# Patient Record
Sex: MALE | Race: WHITE | Employment: UNEMPLOYED
[De-identification: names, ages, dates, MRNs, and addresses within clinical notes are randomized per-mention and may not be internally consistent; named-entity substitution may affect disease eponyms.]

---

## 2024-07-16 ENCOUNTER — HOSPITAL ENCOUNTER (OUTPATIENT)
Facility: HOSPITAL | Age: 19
Discharge: HOME | End: 2024-07-19
Attending: SPECIALIST
Payer: MEDICAID

## 2024-07-16 DIAGNOSIS — D33.3 BENIGN NEOPLASM OF CRANIAL NERVES (HCC): ICD-10-CM

## 2024-07-16 DIAGNOSIS — H90.71 MIXED CONDUCTIVE AND SENSORINEURAL HEARING LOSS OF RIGHT EAR WITH UNRESTRICTED HEARING OF LEFT EAR: ICD-10-CM

## 2024-07-16 DIAGNOSIS — H72.01 CENTRAL PERFORATION OF TYMPANIC MEMBRANE OF RIGHT EAR: ICD-10-CM

## 2024-07-16 PROCEDURE — A9579 GAD-BASE MR CONTRAST NOS,1ML: HCPCS | Performed by: SPECIALIST

## 2024-07-16 PROCEDURE — 70553 MRI BRAIN STEM W/O & W/DYE: CPT

## 2024-07-16 PROCEDURE — 6360000004 HC RX CONTRAST MEDICATION: Performed by: SPECIALIST

## 2024-07-16 RX ADMIN — GADOTERIDOL 20 ML: 279.3 INJECTION, SOLUTION INTRAVENOUS at 12:02

## 2024-07-17 NOTE — PERIOP NOTE
PAT INTERVIEW DONE WITH PATIENT. HE WAS GIVEN INSTRUCTIONS ON MEDS, NPO PAST MIDNIGHT, AND USE OF CHG SOAP. HE WAS GIVEN OPPORTUNITY FOR QUESTIONS. HE VOICES UNDERSTANDING.

## 2024-07-23 ENCOUNTER — ANESTHESIA EVENT (OUTPATIENT)
Facility: HOSPITAL | Age: 19
End: 2024-07-23
Payer: MEDICAID

## 2024-07-23 ENCOUNTER — ANESTHESIA (OUTPATIENT)
Facility: HOSPITAL | Age: 19
End: 2024-07-23
Payer: MEDICAID

## 2024-07-23 ENCOUNTER — HOSPITAL ENCOUNTER (OUTPATIENT)
Facility: HOSPITAL | Age: 19
Setting detail: OUTPATIENT SURGERY
Discharge: HOME | End: 2024-07-23
Attending: SPECIALIST | Admitting: SPECIALIST
Payer: MEDICAID

## 2024-07-23 VITALS
SYSTOLIC BLOOD PRESSURE: 134 MMHG | HEIGHT: 68 IN | RESPIRATION RATE: 23 BRPM | OXYGEN SATURATION: 95 % | DIASTOLIC BLOOD PRESSURE: 71 MMHG | HEART RATE: 94 BPM | WEIGHT: 308 LBS | TEMPERATURE: 98.6 F | BODY MASS INDEX: 46.68 KG/M2

## 2024-07-23 DIAGNOSIS — G89.18 POST-OP PAIN: Primary | ICD-10-CM

## 2024-07-23 PROCEDURE — 3700000001 HC ADD 15 MINUTES (ANESTHESIA): Performed by: SPECIALIST

## 2024-07-23 PROCEDURE — 6370000000 HC RX 637 (ALT 250 FOR IP): Performed by: SPECIALIST

## 2024-07-23 PROCEDURE — 2500000003 HC RX 250 WO HCPCS: Performed by: NURSE ANESTHETIST, CERTIFIED REGISTERED

## 2024-07-23 PROCEDURE — 2580000003 HC RX 258: Performed by: NURSE ANESTHETIST, CERTIFIED REGISTERED

## 2024-07-23 PROCEDURE — 2709999900 HC NON-CHARGEABLE SUPPLY: Performed by: SPECIALIST

## 2024-07-23 PROCEDURE — 3600000004 HC SURGERY LEVEL 4 BASE: Performed by: SPECIALIST

## 2024-07-23 PROCEDURE — 3600000014 HC SURGERY LEVEL 4 ADDTL 15MIN: Performed by: SPECIALIST

## 2024-07-23 PROCEDURE — 6360000002 HC RX W HCPCS: Performed by: ANESTHESIOLOGY

## 2024-07-23 PROCEDURE — 3700000000 HC ANESTHESIA ATTENDED CARE: Performed by: SPECIALIST

## 2024-07-23 PROCEDURE — 6360000002 HC RX W HCPCS: Performed by: NURSE ANESTHETIST, CERTIFIED REGISTERED

## 2024-07-23 PROCEDURE — 7100000011 HC PHASE II RECOVERY - ADDTL 15 MIN: Performed by: SPECIALIST

## 2024-07-23 PROCEDURE — 7100000001 HC PACU RECOVERY - ADDTL 15 MIN: Performed by: SPECIALIST

## 2024-07-23 PROCEDURE — 6360000002 HC RX W HCPCS: Performed by: SPECIALIST

## 2024-07-23 PROCEDURE — 2580000003 HC RX 258: Performed by: ANESTHESIOLOGY

## 2024-07-23 PROCEDURE — 7100000000 HC PACU RECOVERY - FIRST 15 MIN: Performed by: SPECIALIST

## 2024-07-23 PROCEDURE — 7100000010 HC PHASE II RECOVERY - FIRST 15 MIN: Performed by: SPECIALIST

## 2024-07-23 PROCEDURE — 2500000003 HC RX 250 WO HCPCS: Performed by: SPECIALIST

## 2024-07-23 RX ORDER — CEFAZOLIN SODIUM 1 G/3ML
INJECTION, POWDER, FOR SOLUTION INTRAMUSCULAR; INTRAVENOUS PRN
Status: DISCONTINUED | OUTPATIENT
Start: 2024-07-23 | End: 2024-07-23 | Stop reason: SDUPTHER

## 2024-07-23 RX ORDER — MEPERIDINE HYDROCHLORIDE 25 MG/ML
12.5 INJECTION INTRAMUSCULAR; INTRAVENOUS; SUBCUTANEOUS EVERY 5 MIN PRN
Status: DISCONTINUED | OUTPATIENT
Start: 2024-07-23 | End: 2024-07-23 | Stop reason: HOSPADM

## 2024-07-23 RX ORDER — LIDOCAINE HYDROCHLORIDE AND EPINEPHRINE 10; 10 MG/ML; UG/ML
20 INJECTION, SOLUTION INFILTRATION; PERINEURAL ONCE
Status: CANCELLED | OUTPATIENT
Start: 2024-07-23 | End: 2024-07-23

## 2024-07-23 RX ORDER — HYDROCODONE BITARTRATE AND ACETAMINOPHEN 5; 325 MG/1; MG/1
1 TABLET ORAL EVERY 6 HOURS PRN
Qty: 20 TABLET | Refills: 0 | Status: SHIPPED | OUTPATIENT
Start: 2024-07-23 | End: 2024-07-28

## 2024-07-23 RX ORDER — DIPHENHYDRAMINE HYDROCHLORIDE 50 MG/ML
12.5 INJECTION INTRAMUSCULAR; INTRAVENOUS
Status: DISCONTINUED | OUTPATIENT
Start: 2024-07-23 | End: 2024-07-23 | Stop reason: HOSPADM

## 2024-07-23 RX ORDER — SODIUM CHLORIDE 0.9 % (FLUSH) 0.9 %
5-40 SYRINGE (ML) INJECTION EVERY 12 HOURS SCHEDULED
Status: DISCONTINUED | OUTPATIENT
Start: 2024-07-23 | End: 2024-07-23 | Stop reason: HOSPADM

## 2024-07-23 RX ORDER — SODIUM CHLORIDE 9 MG/ML
INJECTION, SOLUTION INTRAVENOUS CONTINUOUS
Status: DISCONTINUED | OUTPATIENT
Start: 2024-07-23 | End: 2024-07-23 | Stop reason: HOSPADM

## 2024-07-23 RX ORDER — CIPROFLOXACIN HYDROCHLORIDE 3.5 MG/ML
5 SOLUTION/ DROPS TOPICAL 2 TIMES DAILY
Qty: 10 ML | Refills: 5 | Status: SHIPPED | OUTPATIENT
Start: 2024-07-23 | End: 2024-08-22

## 2024-07-23 RX ORDER — SUCCINYLCHOLINE/SOD CL,ISO/PF 200MG/10ML
SYRINGE (ML) INTRAVENOUS PRN
Status: DISCONTINUED | OUTPATIENT
Start: 2024-07-23 | End: 2024-07-23 | Stop reason: SDUPTHER

## 2024-07-23 RX ORDER — SODIUM CHLORIDE 9 MG/ML
INJECTION, SOLUTION INTRAVENOUS PRN
Status: DISCONTINUED | OUTPATIENT
Start: 2024-07-23 | End: 2024-07-23 | Stop reason: HOSPADM

## 2024-07-23 RX ORDER — SODIUM CHLORIDE, SODIUM LACTATE, POTASSIUM CHLORIDE, CALCIUM CHLORIDE 600; 310; 30; 20 MG/100ML; MG/100ML; MG/100ML; MG/100ML
INJECTION, SOLUTION INTRAVENOUS CONTINUOUS
Status: DISCONTINUED | OUTPATIENT
Start: 2024-07-23 | End: 2024-07-23 | Stop reason: HOSPADM

## 2024-07-23 RX ORDER — HYDROMORPHONE HYDROCHLORIDE 1 MG/ML
0.5 INJECTION, SOLUTION INTRAMUSCULAR; INTRAVENOUS; SUBCUTANEOUS EVERY 5 MIN PRN
Status: DISCONTINUED | OUTPATIENT
Start: 2024-07-23 | End: 2024-07-23 | Stop reason: HOSPADM

## 2024-07-23 RX ORDER — GINSENG 100 MG
CAPSULE ORAL PRN
Status: DISCONTINUED | OUTPATIENT
Start: 2024-07-23 | End: 2024-07-23 | Stop reason: HOSPADM

## 2024-07-23 RX ORDER — FENTANYL CITRATE 50 UG/ML
INJECTION, SOLUTION INTRAMUSCULAR; INTRAVENOUS PRN
Status: DISCONTINUED | OUTPATIENT
Start: 2024-07-23 | End: 2024-07-23 | Stop reason: SDUPTHER

## 2024-07-23 RX ORDER — CIPROFLOXACIN HYDROCHLORIDE 3.5 MG/ML
5 SOLUTION/ DROPS TOPICAL 2 TIMES DAILY
Status: CANCELLED | OUTPATIENT
Start: 2024-07-23

## 2024-07-23 RX ORDER — ONDANSETRON 2 MG/ML
4 INJECTION INTRAMUSCULAR; INTRAVENOUS ONCE
Status: DISCONTINUED | OUTPATIENT
Start: 2024-07-23 | End: 2024-07-23 | Stop reason: HOSPADM

## 2024-07-23 RX ORDER — ROCURONIUM BROMIDE 10 MG/ML
INJECTION, SOLUTION INTRAVENOUS PRN
Status: DISCONTINUED | OUTPATIENT
Start: 2024-07-23 | End: 2024-07-23 | Stop reason: SDUPTHER

## 2024-07-23 RX ORDER — ONDANSETRON 2 MG/ML
4 INJECTION INTRAMUSCULAR; INTRAVENOUS
Status: DISCONTINUED | OUTPATIENT
Start: 2024-07-23 | End: 2024-07-23 | Stop reason: HOSPADM

## 2024-07-23 RX ORDER — MIDAZOLAM HYDROCHLORIDE 2 MG/2ML
2 INJECTION, SOLUTION INTRAMUSCULAR; INTRAVENOUS
Status: COMPLETED | OUTPATIENT
Start: 2024-07-23 | End: 2024-07-23

## 2024-07-23 RX ORDER — OFLOXACIN 3 MG/ML
SOLUTION AURICULAR (OTIC) PRN
Status: DISCONTINUED | OUTPATIENT
Start: 2024-07-23 | End: 2024-07-23 | Stop reason: HOSPADM

## 2024-07-23 RX ORDER — LIDOCAINE HYDROCHLORIDE 20 MG/ML
INJECTION, SOLUTION EPIDURAL; INFILTRATION; INTRACAUDAL; PERINEURAL PRN
Status: DISCONTINUED | OUTPATIENT
Start: 2024-07-23 | End: 2024-07-23 | Stop reason: SDUPTHER

## 2024-07-23 RX ORDER — GINSENG 100 MG
CAPSULE ORAL PRN
Status: CANCELLED | OUTPATIENT
Start: 2024-07-23

## 2024-07-23 RX ORDER — DEXAMETHASONE SODIUM PHOSPHATE 4 MG/ML
INJECTION, SOLUTION INTRA-ARTICULAR; INTRALESIONAL; INTRAMUSCULAR; INTRAVENOUS; SOFT TISSUE PRN
Status: DISCONTINUED | OUTPATIENT
Start: 2024-07-23 | End: 2024-07-23 | Stop reason: SDUPTHER

## 2024-07-23 RX ORDER — ONDANSETRON 2 MG/ML
INJECTION INTRAMUSCULAR; INTRAVENOUS PRN
Status: DISCONTINUED | OUTPATIENT
Start: 2024-07-23 | End: 2024-07-23 | Stop reason: SDUPTHER

## 2024-07-23 RX ORDER — LIDOCAINE HYDROCHLORIDE AND EPINEPHRINE 10; 10 MG/ML; UG/ML
INJECTION, SOLUTION INFILTRATION; PERINEURAL PRN
Status: DISCONTINUED | OUTPATIENT
Start: 2024-07-23 | End: 2024-07-23 | Stop reason: HOSPADM

## 2024-07-23 RX ORDER — EPINEPHRINE 1 MG/ML(1)
AMPUL (ML) INJECTION PRN
Status: DISCONTINUED | OUTPATIENT
Start: 2024-07-23 | End: 2024-07-23 | Stop reason: HOSPADM

## 2024-07-23 RX ORDER — FENTANYL CITRATE 50 UG/ML
50 INJECTION, SOLUTION INTRAMUSCULAR; INTRAVENOUS EVERY 5 MIN PRN
Status: DISCONTINUED | OUTPATIENT
Start: 2024-07-23 | End: 2024-07-23 | Stop reason: HOSPADM

## 2024-07-23 RX ORDER — SODIUM CHLORIDE 0.9 % (FLUSH) 0.9 %
5-40 SYRINGE (ML) INJECTION PRN
Status: DISCONTINUED | OUTPATIENT
Start: 2024-07-23 | End: 2024-07-23 | Stop reason: HOSPADM

## 2024-07-23 RX ORDER — PROPOFOL 10 MG/ML
INJECTION, EMULSION INTRAVENOUS PRN
Status: DISCONTINUED | OUTPATIENT
Start: 2024-07-23 | End: 2024-07-23 | Stop reason: SDUPTHER

## 2024-07-23 RX ORDER — HYDRALAZINE HYDROCHLORIDE 20 MG/ML
10 INJECTION INTRAMUSCULAR; INTRAVENOUS
Status: DISCONTINUED | OUTPATIENT
Start: 2024-07-23 | End: 2024-07-23 | Stop reason: HOSPADM

## 2024-07-23 RX ORDER — MIDAZOLAM HYDROCHLORIDE 2 MG/2ML
2 INJECTION, SOLUTION INTRAMUSCULAR; INTRAVENOUS
Status: DISCONTINUED | OUTPATIENT
Start: 2024-07-23 | End: 2024-07-23 | Stop reason: HOSPADM

## 2024-07-23 RX ORDER — DEXAMETHASONE SODIUM PHOSPHATE 10 MG/ML
10 INJECTION, SOLUTION INTRAMUSCULAR; INTRAVENOUS ONCE
Status: CANCELLED | OUTPATIENT
Start: 2024-07-23 | End: 2024-07-23

## 2024-07-23 RX ORDER — NALOXONE HYDROCHLORIDE 0.4 MG/ML
INJECTION, SOLUTION INTRAMUSCULAR; INTRAVENOUS; SUBCUTANEOUS PRN
Status: DISCONTINUED | OUTPATIENT
Start: 2024-07-23 | End: 2024-07-23 | Stop reason: HOSPADM

## 2024-07-23 RX ORDER — FENTANYL CITRATE 50 UG/ML
100 INJECTION, SOLUTION INTRAMUSCULAR; INTRAVENOUS
Status: DISCONTINUED | OUTPATIENT
Start: 2024-07-23 | End: 2024-07-23 | Stop reason: HOSPADM

## 2024-07-23 RX ORDER — AMOXICILLIN AND CLAVULANATE POTASSIUM 875; 125 MG/1; MG/1
1 TABLET, FILM COATED ORAL 2 TIMES DAILY
Qty: 14 TABLET | Refills: 0 | Status: SHIPPED | OUTPATIENT
Start: 2024-07-23 | End: 2024-07-30

## 2024-07-23 RX ORDER — PROCHLORPERAZINE EDISYLATE 5 MG/ML
5 INJECTION INTRAMUSCULAR; INTRAVENOUS
Status: DISCONTINUED | OUTPATIENT
Start: 2024-07-23 | End: 2024-07-23 | Stop reason: HOSPADM

## 2024-07-23 RX ADMIN — SODIUM CHLORIDE, POTASSIUM CHLORIDE, SODIUM LACTATE AND CALCIUM CHLORIDE: 600; 310; 30; 20 INJECTION, SOLUTION INTRAVENOUS at 11:33

## 2024-07-23 RX ADMIN — PROPOFOL 250 MG: 10 INJECTION, EMULSION INTRAVENOUS at 12:42

## 2024-07-23 RX ADMIN — LIDOCAINE HYDROCHLORIDE 50 MG: 20 INJECTION, SOLUTION EPIDURAL; INFILTRATION; INTRACAUDAL; PERINEURAL at 12:42

## 2024-07-23 RX ADMIN — Medication 200 MG: at 12:43

## 2024-07-23 RX ADMIN — FENTANYL CITRATE 100 MCG: 0.05 INJECTION, SOLUTION INTRAMUSCULAR; INTRAVENOUS at 12:42

## 2024-07-23 RX ADMIN — ROCURONIUM BROMIDE 10 MG: 10 INJECTION, SOLUTION INTRAVENOUS at 12:42

## 2024-07-23 RX ADMIN — MIDAZOLAM HYDROCHLORIDE 2 MG: 1 INJECTION, SOLUTION INTRAMUSCULAR; INTRAVENOUS at 12:27

## 2024-07-23 RX ADMIN — PHENYLEPHRINE HYDROCHLORIDE 40 MCG/MIN: 10 INJECTION INTRAVENOUS at 13:12

## 2024-07-23 RX ADMIN — FENTANYL CITRATE 50 MCG: 0.05 INJECTION, SOLUTION INTRAMUSCULAR; INTRAVENOUS at 12:57

## 2024-07-23 RX ADMIN — HYDROMORPHONE HYDROCHLORIDE 0.5 MG: 1 INJECTION, SOLUTION INTRAMUSCULAR; INTRAVENOUS; SUBCUTANEOUS at 14:53

## 2024-07-23 RX ADMIN — PROPOFOL 100 MG: 10 INJECTION, EMULSION INTRAVENOUS at 12:55

## 2024-07-23 RX ADMIN — DEXAMETHASONE SODIUM PHOSPHATE 4 MG: 4 INJECTION INTRA-ARTICULAR; INTRALESIONAL; INTRAMUSCULAR; INTRAVENOUS; SOFT TISSUE at 12:57

## 2024-07-23 RX ADMIN — CEFAZOLIN 3 G: 1 INJECTION, POWDER, FOR SOLUTION INTRAMUSCULAR; INTRAVENOUS at 12:52

## 2024-07-23 RX ADMIN — ONDANSETRON 4 MG: 2 INJECTION INTRAMUSCULAR; INTRAVENOUS at 13:52

## 2024-07-23 RX ADMIN — PROPOFOL 100 MG: 10 INJECTION, EMULSION INTRAVENOUS at 13:03

## 2024-07-23 RX ADMIN — SODIUM CHLORIDE, POTASSIUM CHLORIDE, SODIUM LACTATE AND CALCIUM CHLORIDE: 600; 310; 30; 20 INJECTION, SOLUTION INTRAVENOUS at 14:41

## 2024-07-23 RX ADMIN — FENTANYL CITRATE 50 MCG: 50 INJECTION INTRAMUSCULAR; INTRAVENOUS at 14:35

## 2024-07-23 RX ADMIN — PROPOFOL 50 MG: 10 INJECTION, EMULSION INTRAVENOUS at 12:51

## 2024-07-23 NOTE — DISCHARGE INSTRUCTIONS
THE [facility]  POSTOPERATIVE INSTRUCTIONS  FOR PATIENTS UNDERGOING  SURGERY OF THE EAR      Do not blow your nose for three weeks following surgery.  If you sneeze or cough do so with your mouth open.      DO NOT USE CPAP or BIPAP  x 4 weeks following the operation. DO NOT  USE Q-tips® or stick anything in the operated ear.      Avoid any heavy lifting (over 10 lbs.), straining or bending for three weeks following surgery.    Keep your head elevated as much as possible x 48 hours .  Sleep and rest on two to three pillows if possible.    Do not get water in your ear.  If showering or washing your hair place a piece of cotton coated in Vaseline in the ear canal to seal it.  If there is a separate incision keep this dry x 48 hours.    If you wear glasses either remove the arm on the operated side or make certain that it does not rest on the incision behind your ear for one week.    Beginning one day after surgery try to leave the cotton out of our ear as much as possible unless there is significant drainage.    Some drainage from your ear canal may occur after surgery.  If there is a separate incision some drainage may occur from this area also.  If the drainage is profuse or develops a foul odor please call.    Popping sounds, a plugged sensation, ringing or fluctuating hearing may be noticed in the ear during the healing.    Avoid travel by air for 3 weeks following surgery.    If you should notice any swelling, redness or excessive pain please call.    Some dizziness may occur after surgery.  If it becomes severe or is associated with nausea or vomiting please call.    Please call [facility] to make an appointment to be seen 7-10 days after the time of your surgery unless stated otherwise by your physician.      I understand and acknowledge receipt of the instructions indicated above.

## 2024-07-23 NOTE — BRIEF OP NOTE
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 [H72.01]     * Conductive hearing loss of right ear, unspecified hearing status on contralateral side [H90.11]    Post-Op Diagnosis: Same       Procedure(s):  RIGHT TRANSCANAL TYMPANOPLASTY/CARTILAGE GRAFT    Surgeon(s):  Calvin Gamez MD    Assistant:  Resident: Romie Leahy MD    Anesthesia: General    Estimated Blood Loss (mL): Minimal    Complications: None    Specimens:   * No specimens in log *    Implants:  * No implants in log *      Drains: * No LDAs found *    Findings:  Infection Present At Time Of Surgery (PATOS) (choose all levels that have infection present):  No infection present  Other Findings:     Electronically signed by Calvin Gamez MD on 7/23/2024 at 2:11 PM

## 2024-07-23 NOTE — H&P
[facility]   Calvin Gamez MD  [phone] E.A.R.S ([id number])  History and Physical    Subjective:      Satish Alcaraz is a 18 y.o. male who presents for correction  of right eardrum perforation .  The risks of the operation were reviewed in the office as well as in the preoperative area  in an effort to clarify the risks and complications of the operation and clarify any misconceptions.     History reviewed. No pertinent past medical history.  Past Surgical History:   Procedure Laterality Date    CHOLECYSTECTOMY  2024    LAP    TYMPANOSTOMY TUBE PLACEMENT Bilateral       Family History   Problem Relation Age of Onset    No Known Problems Mother     No Known Problems Father     No Known Problems Brother     Anesth Problems Neg Hx      Social History     Tobacco Use    Smoking status: Never    Smokeless tobacco: Never   Substance Use Topics    Alcohol use: Not Currently      Prior to Admission medications    Medication Sig Start Date End Date Taking? Authorizing Provider   OMEGA-3 FATTY ACIDS PO Take by mouth daily   Yes Provider, Historical, MD      No Known Allergies        Objective:      No data found.    No data recorded.      Physical Exam:  GENERAL: alert, cooperative, no distress, appears stated age,   LUNG: clear to auscultation bilaterally,    HEART: regular rate and rhythm,     AS: tympanic membrane normal no infection  AD: 25-50%  tympanic membrane perforation     Nose clear anteriorly  OC clear normal   Neck no masses      Assessment:     Right eardrum perforation  Right conductive hearing loss     Plan:     Discussed the risk of surgery including   Total hearing loss 1 %    Ear or skin scarring 100%,   Tinnitus formation 25%,   Change in taste 15%  Facial paralysis 1%,   Numbness of the ear 100%,    If a graft is placed failure can be as high as 25% depending your health and habits,   If cholesteatoma is found recurrence rates are 25% and the need for further operations discussed ,  Pt was asked to

## 2024-07-23 NOTE — ANESTHESIA POSTPROCEDURE EVALUATION
Department of Anesthesiology  Postprocedure Note    Patient: Satish Alcaraz  MRN: [id number]  YOB: 2005  Date of evaluation: 7/23/2024    Procedure Summary       Date: 07/23/24 Room / Location: [facility] MAIN OR [address] [place] [address] MAIN OR    Anesthesia Start: 1227 Anesthesia Stop: 1421    Procedure: RIGHT TRANSCANAL TYMPANOPLASTY/CARTILAGE GRAFT (Right) Diagnosis:       Central perforation of tympanic membrane of right ear      Conductive hearing loss of right ear, unspecified hearing status on contralateral side      (Central perforation of tympanic membrane of right ear [H72.01])      (Conductive hearing loss of right ear, unspecified hearing status on contralateral side [H90.11])    Providers: Calvin Gamez MD Responsible Provider: Rafy Godwin DO    Anesthesia Type: General ASA Status: 2            Anesthesia Type: General    Lizett Phase I:      Lizett Phase II:      Anesthesia Post Evaluation    Patient location during evaluation: PACU  Patient participation: complete - patient participated  Level of consciousness: awake  Pain score: 0  Airway patency: patent  Nausea & Vomiting: no nausea  Cardiovascular status: hemodynamically stable  Respiratory status: acceptable  Hydration status: euvolemic  Comments: Seen, no complaints   Pain management: adequate    No notable events documented.

## 2024-07-23 NOTE — ANESTHESIA PRE PROCEDURE
ROS  Exercise tolerance: good (>4 METS)          Rhythm: regular  Rate: normal                    Neuro/Psych:                ROS comment: Auditory dysfunction   GI/Hepatic/Renal: Neg GI/Hepatic/Renal ROS            Endo/Other: Negative Endo/Other ROS                    Abdominal:             Vascular: negative vascular ROS.         Other Findings:             Anesthesia Plan      general     ASA 2       Induction: intravenous.      Anesthetic plan and risks discussed with patient and father.                        Rafy Godwin,    7/23/2024

## 2024-07-24 NOTE — OP NOTE
[address]  [address]                            OPERATIVE REPORT      PATIENT NAME: FINA BURROWS                   : 2005  MED REC NO: [id number]                       ROOM: OR  ACCOUNT NO: [id number]                       ADMIT DATE: 2024  PROVIDER: Calvin Gamez MD    DATE OF SERVICE:  2024    PREOPERATIVE DIAGNOSES:  Right tympanic membrane perforation.    POSTOPERATIVE DIAGNOSES:  Right tympanic membrane perforation.    PROCEDURES PERFORMED:       1. Right transcanal tympanoplasty.     2. Cartilage graft through separate incision and separate site.     3. Microdissection.    SURGEON:  Calvin Gamez MD    ASSISTANT:  None.    ANESTHESIA:  General.    ESTIMATED BLOOD LOSS:  5 mL.    SPECIMENS REMOVED:  None.    INTRAOPERATIVE FINDINGS:     1. Anterior perforation approximately 25% of tympanic membrane.  2. Normal ossicular chain although hypomobile.     COMPLICATIONS:  None.    IMPLANTS:  None.    INDICATIONS:  Right eardrum perforation and hearing loss     DESCRIPTION OF PROCEDURE:  After the patient received informed consent as well as the parent, the patient was taken to the operating room.  The patient was kept in the supine position and dressed in the usual fashion.  After administration of general anesthesia, table was turned to 180 degrees away from the neutral position.    10 mL of 1% lidocaine with 1:100,000 epinephrine injected in the pre and postauricular site on the right side.  The operative microscope will be used for the entire case.  The patient was then dressed in usual sterile fashion.  After this was performed, the operating microscope would be used to examine the external canal and the tympanic membrane.  There was a 25% anterior perforation.  The edges of those revitalized using sharp dissection.    A separate incision was made in the zeferino cartilage region.  Deeper tissues were incised.  A 1 x 1 cm

## 2024-10-29 ENCOUNTER — ANESTHESIA (OUTPATIENT)
Facility: HOSPITAL | Age: 19
End: 2024-10-29
Payer: MEDICAID

## 2024-10-29 ENCOUNTER — ANESTHESIA EVENT (OUTPATIENT)
Facility: HOSPITAL | Age: 19
End: 2024-10-29
Payer: MEDICAID

## 2024-10-29 ENCOUNTER — HOSPITAL ENCOUNTER (OUTPATIENT)
Facility: HOSPITAL | Age: 19
Setting detail: OUTPATIENT SURGERY
Discharge: HOME | End: 2024-10-29
Attending: SPECIALIST | Admitting: SPECIALIST
Payer: MEDICAID

## 2024-10-29 VITALS
TEMPERATURE: 98 F | HEIGHT: 68 IN | WEIGHT: 308 LBS | SYSTOLIC BLOOD PRESSURE: 111 MMHG | OXYGEN SATURATION: 95 % | HEART RATE: 88 BPM | RESPIRATION RATE: 27 BRPM | BODY MASS INDEX: 46.68 KG/M2 | DIASTOLIC BLOOD PRESSURE: 62 MMHG

## 2024-10-29 PROCEDURE — 2500000003 HC RX 250 WO HCPCS

## 2024-10-29 PROCEDURE — 2720000010 HC SURG SUPPLY STERILE: Performed by: SPECIALIST

## 2024-10-29 PROCEDURE — 6360000002 HC RX W HCPCS

## 2024-10-29 PROCEDURE — 88342 IMHCHEM/IMCYTCHM 1ST ANTB: CPT

## 2024-10-29 PROCEDURE — 7100000011 HC PHASE II RECOVERY - ADDTL 15 MIN: Performed by: SPECIALIST

## 2024-10-29 PROCEDURE — 3600007502: Performed by: SPECIALIST

## 2024-10-29 PROCEDURE — 7100000010 HC PHASE II RECOVERY - FIRST 15 MIN: Performed by: SPECIALIST

## 2024-10-29 PROCEDURE — 3700000000 HC ANESTHESIA ATTENDED CARE: Performed by: SPECIALIST

## 2024-10-29 PROCEDURE — 2709999900 HC NON-CHARGEABLE SUPPLY: Performed by: SPECIALIST

## 2024-10-29 PROCEDURE — 2580000003 HC RX 258

## 2024-10-29 PROCEDURE — 88305 TISSUE EXAM BY PATHOLOGIST: CPT

## 2024-10-29 RX ORDER — SODIUM CHLORIDE 9 MG/ML
INJECTION, SOLUTION INTRAVENOUS PRN
Status: DISCONTINUED | OUTPATIENT
Start: 2024-10-29 | End: 2024-10-29 | Stop reason: HOSPADM

## 2024-10-29 RX ORDER — SODIUM CHLORIDE 0.9 % (FLUSH) 0.9 %
5-40 SYRINGE (ML) INJECTION EVERY 12 HOURS SCHEDULED
Status: DISCONTINUED | OUTPATIENT
Start: 2024-10-29 | End: 2024-10-29 | Stop reason: HOSPADM

## 2024-10-29 RX ORDER — SODIUM CHLORIDE 9 MG/ML
INJECTION, SOLUTION INTRAVENOUS CONTINUOUS
Status: CANCELLED | OUTPATIENT
Start: 2024-10-29

## 2024-10-29 RX ORDER — SODIUM CHLORIDE 9 MG/ML
INJECTION, SOLUTION INTRAVENOUS CONTINUOUS
Status: DISCONTINUED | OUTPATIENT
Start: 2024-10-29 | End: 2024-10-29 | Stop reason: HOSPADM

## 2024-10-29 RX ORDER — SODIUM CHLORIDE 0.9 % (FLUSH) 0.9 %
5-40 SYRINGE (ML) INJECTION PRN
Status: CANCELLED | OUTPATIENT
Start: 2024-10-29

## 2024-10-29 RX ORDER — SODIUM CHLORIDE 9 MG/ML
INJECTION, SOLUTION INTRAVENOUS PRN
Status: CANCELLED | OUTPATIENT
Start: 2024-10-29

## 2024-10-29 RX ORDER — SODIUM CHLORIDE 0.9 % (FLUSH) 0.9 %
5-40 SYRINGE (ML) INJECTION PRN
Status: DISCONTINUED | OUTPATIENT
Start: 2024-10-29 | End: 2024-10-29 | Stop reason: HOSPADM

## 2024-10-29 RX ORDER — 0.9 % SODIUM CHLORIDE 0.9 %
INTRAVENOUS SOLUTION INTRAVENOUS
Status: DISCONTINUED | OUTPATIENT
Start: 2024-10-29 | End: 2024-10-29 | Stop reason: SDUPTHER

## 2024-10-29 RX ORDER — LIDOCAINE HYDROCHLORIDE 20 MG/ML
INJECTION, SOLUTION EPIDURAL; INFILTRATION; INTRACAUDAL; PERINEURAL
Status: DISCONTINUED | OUTPATIENT
Start: 2024-10-29 | End: 2024-10-29 | Stop reason: SDUPTHER

## 2024-10-29 RX ORDER — SODIUM CHLORIDE 0.9 % (FLUSH) 0.9 %
5-40 SYRINGE (ML) INJECTION EVERY 12 HOURS SCHEDULED
Status: CANCELLED | OUTPATIENT
Start: 2024-10-29

## 2024-10-29 RX ORDER — PANTOPRAZOLE SODIUM 40 MG/1
40 TABLET, DELAYED RELEASE ORAL 2 TIMES DAILY
COMMUNITY
Start: 2024-10-16

## 2024-10-29 RX ADMIN — PROPOFOL 150 MG: 10 INJECTION, EMULSION INTRAVENOUS at 10:42

## 2024-10-29 RX ADMIN — SODIUM CHLORIDE 10 ML: 9 INJECTION, SOLUTION INTRAVENOUS at 10:48

## 2024-10-29 RX ADMIN — PROPOFOL 100 MG: 10 INJECTION, EMULSION INTRAVENOUS at 10:43

## 2024-10-29 RX ADMIN — PROPOFOL 100 MG: 10 INJECTION, EMULSION INTRAVENOUS at 10:44

## 2024-10-29 RX ADMIN — PROPOFOL 50 MG: 10 INJECTION, EMULSION INTRAVENOUS at 10:45

## 2024-10-29 RX ADMIN — PROPOFOL 50 MG: 10 INJECTION, EMULSION INTRAVENOUS at 10:46

## 2024-10-29 RX ADMIN — LIDOCAINE HYDROCHLORIDE 100 MG: 20 INJECTION, SOLUTION EPIDURAL; INFILTRATION; INTRACAUDAL; PERINEURAL at 10:42

## 2024-10-29 RX ADMIN — PROPOFOL 50 MG: 10 INJECTION, EMULSION INTRAVENOUS at 10:48

## 2024-10-29 NOTE — ANESTHESIA PRE PROCEDURE
Department of Anesthesiology  Preprocedure Note       Name:  Satish Alcaraz   Age:  19 y.o.  :  2005                                          MRN:  [id number]         Date:  10/29/2024      Surgeon: Surgeon(s):  Jovan Beckford MD    Procedure: Procedure(s):  ESOPHAGOGASTRODUODENOSCOPY    Medications prior to admission:   Prior to Admission medications    Medication Sig Start Date End Date Taking? Authorizing Provider   pantoprazole (PROTONIX) 40 MG tablet Take 1 tablet by mouth 2 times daily 10/16/24  Yes More Davidson MD   OMEGA-3 FATTY ACIDS PO Take by mouth daily  Patient not taking: Reported on 10/29/2024    More Davidson MD       Current medications:    Current Facility-Administered Medications   Medication Dose Route Frequency Provider Last Rate Last Admin   • 0.9 % sodium chloride infusion   IntraVENous Continuous Jovan Beckford MD       • sodium chloride flush 0.9 % injection 5-40 mL  5-40 mL IntraVENous 2 times per day Jovan Beckford MD       • sodium chloride flush 0.9 % injection 5-40 mL  5-40 mL IntraVENous PRN Jovan Beckford MD       • 0.9 % sodium chloride infusion   IntraVENous PRN Jovan Beckford MD           Allergies:  No Known Allergies    Problem List:  There is no problem list on file for this patient.      Past Medical History:  History reviewed. No pertinent past medical history.    Past Surgical History:        Procedure Laterality Date   • CHOLECYSTECTOMY      LAP   • TYMPANOPLASTY Right 2024    RIGHT TRANSCANAL TYMPANOPLASTY/CARTILAGE GRAFT performed by Calvin Gamez MD at [facility] MAIN OR   • TYMPANOSTOMY TUBE PLACEMENT Bilateral        Social History:    Social History     Tobacco Use   • Smoking status: Never   • Smokeless tobacco: Never   Substance Use Topics   • Alcohol use: Not Currently                                Counseling given: Not Answered      Vital Signs (Current):   Vitals:    10/29/24 1025   BP: (!) 141/81   Pulse: 98   Resp: 24   Temp: 98 °F (36.7

## 2024-10-29 NOTE — PROGRESS NOTES
Verified patient name and date of birth, scheduled procedure, and informed consent. Reviewed general discharge instructions and  information.  Assessed patient. Awake, alert, and oriented per baseline. Vital signs stable (see vital sign flowsheet). Respiratory status within defined limits, abdomen soft and non tender. Skin with in defined limits.     Initial RN admission and assessment performed and documented in Endoscopy navigator.     Patient evaluated by anesthesia in pre-procedure holding.     All procedural vital signs, airway assessment, and level of consciousness information monitored and recorded by anesthesia staff on the anesthesia record.     Report received from CRNA post procedure.  Patient transported to recovery area by RN.    Endoscopy post procedure time out was performed and specimens were verified with physician.    Endoscope was pre-cleaned at bedside immediately following procedure by Jenna.

## 2024-10-29 NOTE — H&P
Pre-endoscopy H and P     The patient was seen and examined in the endoscopy suite. The airway was assessed and docuemented. The problem list and medications were reviewed.     There is no problem list on file for this patient.    Social History     Socioeconomic History    Marital status: Single     Spouse name: Not on file    Number of children: Not on file    Years of education: Not on file    Highest education level: Not on file   Occupational History    Not on file   Tobacco Use    Smoking status: Never    Smokeless tobacco: Never   Vaping Use    Vaping status: Never Used   Substance and Sexual Activity    Alcohol use: Not Currently    Drug use: Never    Sexual activity: Not on file   Other Topics Concern    Not on file   Social History Narrative    Not on file     Social Determinants of Health     Financial Resource Strain: Not on file   Food Insecurity: Not on file   Transportation Needs: Not on file   Physical Activity: Not on file   Stress: Not on file   Social Connections: Not on file   Intimate Partner Violence: Not on file   Housing Stability: Not on file     History reviewed. No pertinent past medical history.      Prior to Admission Medications   Prescriptions Last Dose Informant Patient Reported? Taking?   OMEGA-3 FATTY ACIDS PO Not Taking  Yes No   Sig: Take by mouth daily   Patient not taking: Reported on 10/29/2024   pantoprazole (PROTONIX) 40 MG tablet Past Week  Yes Yes   Sig: Take 1 tablet by mouth 2 times daily      Facility-Administered Medications: None       Chief complaint, history of present illness, and review of systems and Past medical History are positive for: Epigastric pain, vomiting, hematemesis    The heart, lungs and mental status were satisfactory for the administration of sedation and for the procedure.     I discussed with the patient the objectives, risks, consequences and alternatives to the procedure.     The patient was counseled at length about the risks of kieran
